# Patient Record
Sex: FEMALE | Race: WHITE | URBAN - METROPOLITAN AREA
[De-identification: names, ages, dates, MRNs, and addresses within clinical notes are randomized per-mention and may not be internally consistent; named-entity substitution may affect disease eponyms.]

---

## 2017-01-25 ENCOUNTER — OFFICE VISIT - HEALTHEAST (OUTPATIENT)
Dept: CARDIOLOGY | Facility: TELEHEALTH | Age: 32
End: 2017-01-25

## 2017-01-25 ENCOUNTER — AMBULATORY - HEALTHEAST (OUTPATIENT)
Dept: CARDIOLOGY | Facility: CLINIC | Age: 32
End: 2017-01-25

## 2017-01-25 ENCOUNTER — COMMUNICATION - HEALTHEAST (OUTPATIENT)
Dept: TELEHEALTH | Facility: CLINIC | Age: 32
End: 2017-01-25

## 2017-01-25 DIAGNOSIS — R94.31 ECG ABNORMALITY: ICD-10-CM

## 2017-01-25 DIAGNOSIS — I49.3 FREQUENT PVCS: ICD-10-CM

## 2017-01-25 ASSESSMENT — MIFFLIN-ST. JEOR: SCORE: 1258.34

## 2017-02-02 ENCOUNTER — HOSPITAL ENCOUNTER (OUTPATIENT)
Dept: MRI IMAGING | Facility: HOSPITAL | Age: 32
Discharge: HOME | End: 2017-02-02
Attending: INTERNAL MEDICINE

## 2017-02-02 DIAGNOSIS — R94.31 ECG ABNORMALITY: ICD-10-CM

## 2017-02-02 DIAGNOSIS — I49.3 FREQUENT PVCS: ICD-10-CM

## 2017-05-05 ENCOUNTER — COMMUNICATION - HEALTHEAST (OUTPATIENT)
Dept: CARDIOLOGY | Facility: CLINIC | Age: 32
End: 2017-05-05

## 2021-05-30 VITALS — BODY MASS INDEX: 18.36 KG/M2 | HEIGHT: 67 IN | WEIGHT: 117 LBS

## 2021-06-25 NOTE — PROGRESS NOTES
"Progress Notes by Odell Prieto MD at 1/25/2017  2:30 PM     Author: Odell Prieto MD Service: -- Author Type: Physician    Filed: 1/25/2017  3:34 PM Encounter Date: 1/25/2017 Status: Signed    : Odell Prieto MD (Physician)           Click to link to [facility] HEART [facility] NOTE    Thank you, Dr. Mcgowan, for asking us to see Maria Del Rosario Low at the [facility].      Assessment/Recommendations   Patient with premature ventricular contractions which are quite symptomatic as well as a chronic Lyme infection symptoms potentially other infections which have debilitated her although she is improved.  She does have a quite abnormal electrocardiogram and although she's had multiple echocardiograms and stress tests and a CT angiogram all of which were unremarkable, I think would be important to evaluate her myocardium which we could do with cardiac MR.  This would evaluate for any abnormalities in the myocardium such as infiltrative disorders which could potentially change her ECG and make her more prone to something such as PVCs.  This could also look for inflammatory findings of the myocardium such as myocarditis or sarcoidosis.  We will obtain the cardiac MR in the future and call her with the results.    I would not treat the PVCs at this time as she is going for some intensive therapy for Lyme's disease at a clinic in [place].  I would consider antiarrhythmic therapies in the future but realizing the side effects are not negligible.    Thank you for allowing us to participate in her care.         History of Present Illness    Ms. Maria Del Rosario Low is a 31 y.o. female with a history of palpitations, recent vigorous treatment for Lyme's disease, hypothyroidism and fatigue weakness and shortness of breath.  She was quite active up until early March 2016.  She was a water skier, a runner, lifted weights, had 2 children and had a \"normal active life\".  She " developed a rhythm disturbance of her heart in early March 2016 and went to the emergency room and the episode abated but she had PVCs on the monitor.  She's never had an episode of rapid irregular heartbeat as bad as that since that time but is continuing to have palpitations which can cause her to feel not well for up to an hour after each PVC.  She's been evaluated to [facility] [place] [facility], [facility], and [facility] and has had echocardiograms which have been unremarkable.  She had a regular stress test which was unremarkable.  She had a CT angiogram at the [facility] [place] [facility] which showed normal coronary arteries.  She continues to get short of breath with activity and some days is bedridden.  She is being treated by Dr. Vallejo for chronic Lyme and other chronic infections and also sees a homeopathic physician here in [facility].  She denies orthopnea, paroxysmal nocturnal dyspnea, peripheral edema and has had near syncopal episodes but no syncopal episodes.  She has no history of rheumatic fever, cerebrovascular accident or TIA.  She's had scans of her brain as well.  She is not hypertensive, lipid status is unknown at this time, and her mother has some type of heart disease as well managed.  She does not smoke.    ECG: Personally reviewed.  From previous tracing she has quite significant inferior and lateral ST depression with T-wave changes.       Physical Examination Review of Systems   Vitals:    01/25/17 1440   BP: 98/72   Pulse: 80   Resp: 16     Body mass index is 18.32 kg/(m^2).  Wt Readings from Last 3 Encounters:   01/25/17 117 lb (53.1 kg)     General Appearance:   Alert, cooperative and in no acute distress.   ENT/Mouth: Oral mucuos membranes pink and moist .      EYES:  No scleral icterus. No Xanthelasma.    Neck: JVP normal. No Hepatojugular reflux. Thyroid not visualized   Chest/Lungs:   Lungs are clear to auscultation, equal chest wall expansion    Cardiovascular:   S1, S2 without  murmur ,clicks or rubs. Brachial, radial and posterior tibial pulses are intact and symetric. No carotid bruits noted   Abdomen:  Nontender. BS+. No bruits.      Extremities: No cyanosis, clubbing or edema   Skin: no xanthelasma, warm.    Psych: Appropriate affect.   Neurologic: normal gait, normal  bilateral, no tremors        General: Night Sweats, Weight Loss  Eyes: Visual Distubance  Ears/Nose/Throat: WNL  Lungs: WNL  Heart: Irregular Heartbeat  Stomach: WNL  Bladder: WNL  Muscle/Joints: WNL  Skin: WNL  Nervous System: WNL  Mental Health: WNL     Blood: WNL     Medical History  Surgical History Family History Social History   No past medical history on file. No past surgical history on file. No family history on file. Social History     Social History   ? Marital status:      Spouse name: N/A   ? Number of children: N/A   ? Years of education: N/A     Occupational History   ? Not on file.     Social History Main Topics   ? Smoking status: Never Smoker   ? Smokeless tobacco: Not on file   ? Alcohol use Not on file   ? Drug use: Not on file   ? Sexual activity: Not on file     Other Topics Concern   ? Not on file     Social History Narrative   ? No narrative on file          Medications  Allergies   Current Outpatient Prescriptions   Medication Sig Dispense Refill   ? cholecalciferol, vitamin D3, 5,000 unit capsule Take 5,000 Units by mouth daily.     ? Lactobacillus rhamnosus GG (CULTURELLE) 10-15 Billion cell capsule Take 1 capsule by mouth daily.     ? levothyroxine (SYNTHROID, LEVOTHROID) 100 MCG tablet Take 100 mcg by mouth daily.     ? magnesium sulfate 100 mg cap Take 100 mg by mouth daily.     ? VITAMIN B COMPLEX (B COMPLEX VITAMINS ORAL) Take 1 tablet by mouth daily.       No current facility-administered medications for this visit.       No Known Allergies      Lab Results    Chemistry/lipid CBC Cardiac Enzymes/BNP/TSH/INR   No results found for: CHOL, HDL, LDLCALC, TRIG, CREATININE, BUN, K,  NA, CL, CO2 No results found for: WBC, HGB, HCT, MCV, PLT No results found for: CKTOTAL, CKMB, CKMBINDEX, TROPONINI, BNP, TSH, INR